# Patient Record
Sex: FEMALE | Race: WHITE
[De-identification: names, ages, dates, MRNs, and addresses within clinical notes are randomized per-mention and may not be internally consistent; named-entity substitution may affect disease eponyms.]

---

## 2023-04-02 ENCOUNTER — HOSPITAL ENCOUNTER (EMERGENCY)
Dept: HOSPITAL 26 - MED | Age: 88
Discharge: HOME | End: 2023-04-02
Payer: COMMERCIAL

## 2023-04-02 VITALS — SYSTOLIC BLOOD PRESSURE: 138 MMHG | DIASTOLIC BLOOD PRESSURE: 74 MMHG

## 2023-04-02 VITALS — BODY MASS INDEX: 21.99 KG/M2 | WEIGHT: 112 LBS | HEIGHT: 60 IN

## 2023-04-02 VITALS — DIASTOLIC BLOOD PRESSURE: 70 MMHG | SYSTOLIC BLOOD PRESSURE: 140 MMHG
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
## 2023-04-02 DIAGNOSIS — E11.9: ICD-10-CM

## 2023-04-02 DIAGNOSIS — Z79.899: ICD-10-CM

## 2023-04-02 DIAGNOSIS — Z79.4: ICD-10-CM

## 2023-04-02 DIAGNOSIS — S01.111A: Primary | ICD-10-CM
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
## 2023-04-02 DIAGNOSIS — Z79.82: ICD-10-CM
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
## 2023-04-02 PROCEDURE — 72125 CT NECK SPINE W/O DYE: CPT

## 2023-04-02 PROCEDURE — 70486 CT MAXILLOFACIAL W/O DYE: CPT

## 2023-04-02 PROCEDURE — 12013 RPR F/E/E/N/L/M 2.6-5.0 CM: CPT

## 2023-04-02 PROCEDURE — 70450 CT HEAD/BRAIN W/O DYE: CPT

## 2023-04-02 PROCEDURE — 90715 TDAP VACCINE 7 YRS/> IM: CPT

## 2023-04-02 PROCEDURE — 99285 EMERGENCY DEPT VISIT HI MDM: CPT

## 2023-04-02 PROCEDURE — 73110 X-RAY EXAM OF WRIST: CPT

## 2023-04-02 PROCEDURE — 90471 IMMUNIZATION ADMIN: CPT

## 2023-04-02 NOTE — NUR
received in bed 2 s/p trip and fall. lac to right eyebrow. bleeding controlled. 
right wrist pain 8/10. no deformity. cspt intact. denies blood thinners. aao 
x4. resp even and nonlabored. vss. ambualtory. no n,v, dizziness

## 2023-04-10 ENCOUNTER — HOSPITAL ENCOUNTER (EMERGENCY)
Dept: HOSPITAL 26 - MED | Age: 88
Discharge: LEFT BEFORE DISCHARGE | End: 2023-04-10
Payer: COMMERCIAL

## 2023-04-10 DIAGNOSIS — Z53.21: ICD-10-CM

## 2023-04-10 DIAGNOSIS — Z48.02: Primary | ICD-10-CM

## 2023-04-10 NOTE — NUR
LAST ATTEMPT, NO ANSWER. PATIENT LEFT WITHOUT BEING SEEN BY DR. NÚÑEZ. NO 
FURTHER CARE PROVIDED FOR PATIENT.